# Patient Record
Sex: MALE | Race: WHITE | NOT HISPANIC OR LATINO
[De-identification: names, ages, dates, MRNs, and addresses within clinical notes are randomized per-mention and may not be internally consistent; named-entity substitution may affect disease eponyms.]

---

## 2017-04-24 ENCOUNTER — APPOINTMENT (OUTPATIENT)
Dept: SURGICAL ONCOLOGY | Facility: CLINIC | Age: 72
End: 2017-04-24

## 2017-05-15 ENCOUNTER — APPOINTMENT (OUTPATIENT)
Dept: SURGICAL ONCOLOGY | Facility: CLINIC | Age: 72
End: 2017-05-15

## 2017-05-15 VITALS
WEIGHT: 169 LBS | SYSTOLIC BLOOD PRESSURE: 134 MMHG | DIASTOLIC BLOOD PRESSURE: 74 MMHG | HEIGHT: 67 IN | OXYGEN SATURATION: 96 % | HEART RATE: 89 BPM | BODY MASS INDEX: 26.53 KG/M2

## 2017-06-20 ENCOUNTER — APPOINTMENT (OUTPATIENT)
Dept: SURGICAL ONCOLOGY | Facility: HOSPITAL | Age: 72
End: 2017-06-20

## 2017-07-06 ENCOUNTER — OUTPATIENT (OUTPATIENT)
Dept: OUTPATIENT SERVICES | Facility: HOSPITAL | Age: 72
LOS: 1 days | End: 2017-07-06
Payer: MEDICARE

## 2017-07-06 DIAGNOSIS — Z98.89 OTHER SPECIFIED POSTPROCEDURAL STATES: Chronic | ICD-10-CM

## 2017-07-06 DIAGNOSIS — K43.2 INCISIONAL HERNIA WITHOUT OBSTRUCTION OR GANGRENE: ICD-10-CM

## 2017-07-06 DIAGNOSIS — Z96.659 PRESENCE OF UNSPECIFIED ARTIFICIAL KNEE JOINT: Chronic | ICD-10-CM

## 2017-07-06 LAB
ALBUMIN SERPL ELPH-MCNC: 3.9 G/DL — SIGNIFICANT CHANGE UP (ref 3.3–5)
ALP SERPL-CCNC: 146 U/L — HIGH (ref 40–120)
ALT FLD-CCNC: 29 U/L — SIGNIFICANT CHANGE UP (ref 4–41)
AST SERPL-CCNC: 37 U/L — SIGNIFICANT CHANGE UP (ref 4–40)
BASOPHILS # BLD AUTO: 0.04 K/UL — SIGNIFICANT CHANGE UP (ref 0–0.2)
BASOPHILS NFR BLD AUTO: 1 % — SIGNIFICANT CHANGE UP (ref 0–2)
BILIRUB DIRECT SERPL-MCNC: 0.1 MG/DL — SIGNIFICANT CHANGE UP (ref 0.1–0.2)
BILIRUB SERPL-MCNC: 0.3 MG/DL — SIGNIFICANT CHANGE UP (ref 0.2–1.2)
BLD GP AB SCN SERPL QL: NEGATIVE — SIGNIFICANT CHANGE UP
BUN SERPL-MCNC: 17 MG/DL — SIGNIFICANT CHANGE UP (ref 7–23)
CALCIUM SERPL-MCNC: 9.4 MG/DL — SIGNIFICANT CHANGE UP (ref 8.4–10.5)
CHLORIDE SERPL-SCNC: 104 MMOL/L — SIGNIFICANT CHANGE UP (ref 98–107)
CO2 SERPL-SCNC: 28 MMOL/L — SIGNIFICANT CHANGE UP (ref 22–31)
CREAT SERPL-MCNC: 0.9 MG/DL — SIGNIFICANT CHANGE UP (ref 0.5–1.3)
EOSINOPHIL # BLD AUTO: 0.31 K/UL — SIGNIFICANT CHANGE UP (ref 0–0.5)
EOSINOPHIL NFR BLD AUTO: 7.9 % — HIGH (ref 0–6)
GLUCOSE SERPL-MCNC: 99 MG/DL — SIGNIFICANT CHANGE UP (ref 70–99)
HCT VFR BLD CALC: 41.2 % — SIGNIFICANT CHANGE UP (ref 39–50)
HGB BLD-MCNC: 13.4 G/DL — SIGNIFICANT CHANGE UP (ref 13–17)
IMM GRANULOCYTES # BLD AUTO: 0.02 # — SIGNIFICANT CHANGE UP
IMM GRANULOCYTES NFR BLD AUTO: 0.5 % — SIGNIFICANT CHANGE UP (ref 0–1.5)
LYMPHOCYTES # BLD AUTO: 1.06 K/UL — SIGNIFICANT CHANGE UP (ref 1–3.3)
LYMPHOCYTES # BLD AUTO: 27 % — SIGNIFICANT CHANGE UP (ref 13–44)
MCHC RBC-ENTMCNC: 30.4 PG — SIGNIFICANT CHANGE UP (ref 27–34)
MCHC RBC-ENTMCNC: 32.5 % — SIGNIFICANT CHANGE UP (ref 32–36)
MCV RBC AUTO: 93.4 FL — SIGNIFICANT CHANGE UP (ref 80–100)
MONOCYTES # BLD AUTO: 0.43 K/UL — SIGNIFICANT CHANGE UP (ref 0–0.9)
MONOCYTES NFR BLD AUTO: 11 % — SIGNIFICANT CHANGE UP (ref 2–14)
NEUTROPHILS # BLD AUTO: 2.06 K/UL — SIGNIFICANT CHANGE UP (ref 1.8–7.4)
NEUTROPHILS NFR BLD AUTO: 52.6 % — SIGNIFICANT CHANGE UP (ref 43–77)
NRBC # FLD: 0 — SIGNIFICANT CHANGE UP
PLATELET # BLD AUTO: 180 K/UL — SIGNIFICANT CHANGE UP (ref 150–400)
PMV BLD: 10.6 FL — SIGNIFICANT CHANGE UP (ref 7–13)
POTASSIUM SERPL-MCNC: 4.4 MMOL/L — SIGNIFICANT CHANGE UP (ref 3.5–5.3)
POTASSIUM SERPL-SCNC: 4.4 MMOL/L — SIGNIFICANT CHANGE UP (ref 3.5–5.3)
PROT SERPL-MCNC: 7.4 G/DL — SIGNIFICANT CHANGE UP (ref 6–8.3)
RBC # BLD: 4.41 M/UL — SIGNIFICANT CHANGE UP (ref 4.2–5.8)
RBC # FLD: 15.9 % — HIGH (ref 10.3–14.5)
RH IG SCN BLD-IMP: POSITIVE — SIGNIFICANT CHANGE UP
SODIUM SERPL-SCNC: 144 MMOL/L — SIGNIFICANT CHANGE UP (ref 135–145)
WBC # BLD: 3.92 K/UL — SIGNIFICANT CHANGE UP (ref 3.8–10.5)
WBC # FLD AUTO: 3.92 K/UL — SIGNIFICANT CHANGE UP (ref 3.8–10.5)

## 2017-07-06 PROCEDURE — 93010 ELECTROCARDIOGRAM REPORT: CPT

## 2017-07-06 RX ORDER — SODIUM CHLORIDE 9 MG/ML
3 INJECTION INTRAMUSCULAR; INTRAVENOUS; SUBCUTANEOUS EVERY 8 HOURS
Qty: 0 | Refills: 0 | Status: DISCONTINUED | OUTPATIENT
Start: 2017-07-17 | End: 2017-07-17

## 2017-07-06 RX ORDER — SODIUM CHLORIDE 9 MG/ML
1000 INJECTION, SOLUTION INTRAVENOUS
Qty: 0 | Refills: 0 | Status: DISCONTINUED | OUTPATIENT
Start: 2017-07-17 | End: 2017-07-17

## 2017-07-07 LAB — CANCER AG19-9 SERPL-ACNC: 37.2 U/ML — SIGNIFICANT CHANGE UP

## 2017-07-07 PROCEDURE — 71020: CPT | Mod: 26

## 2017-07-14 NOTE — ASU PATIENT PROFILE, ADULT - PSH
S/P carpal tunnel release  right  S/P ERCP  4/2016   atypical cells seen on pathology  S/P knee replacement  dl  S/P shoulder surgery

## 2017-07-14 NOTE — ASU PATIENT PROFILE, ADULT - PMH
Arthritis    Bile duct obstruction  blocked common bile duct   stent placed 4/2016  Complete tear of left rotator cuff  rehab  surgery 2014  Exposure to Agent Orange  [facility] 1968-9  [employer]  Gastroesophageal reflux disease without esophagitis    Hay fever    Jaundice     activity  [facility]

## 2017-07-17 ENCOUNTER — INPATIENT (INPATIENT)
Facility: HOSPITAL | Age: 72
LOS: 1 days | Discharge: HOME | End: 2017-07-19
Attending: SPECIALIST | Admitting: SPECIALIST
Payer: MEDICARE

## 2017-07-17 ENCOUNTER — APPOINTMENT (OUTPATIENT)
Dept: SURGICAL ONCOLOGY | Facility: HOSPITAL | Age: 72
End: 2017-07-17

## 2017-07-17 VITALS
DIASTOLIC BLOOD PRESSURE: 74 MMHG | HEIGHT: 66 IN | RESPIRATION RATE: 16 BRPM | SYSTOLIC BLOOD PRESSURE: 151 MMHG | TEMPERATURE: 99 F | WEIGHT: 173.94 LBS | OXYGEN SATURATION: 100 % | HEART RATE: 88 BPM

## 2017-07-17 DIAGNOSIS — Z98.89 OTHER SPECIFIED POSTPROCEDURAL STATES: Chronic | ICD-10-CM

## 2017-07-17 DIAGNOSIS — K43.2 INCISIONAL HERNIA WITHOUT OBSTRUCTION OR GANGRENE: ICD-10-CM

## 2017-07-17 DIAGNOSIS — Z96.659 PRESENCE OF UNSPECIFIED ARTIFICIAL KNEE JOINT: Chronic | ICD-10-CM

## 2017-07-17 LAB
BLD GP AB SCN SERPL QL: NEGATIVE — SIGNIFICANT CHANGE UP
RH IG SCN BLD-IMP: POSITIVE — SIGNIFICANT CHANGE UP

## 2017-07-17 PROCEDURE — 49568: CPT | Mod: 59

## 2017-07-17 PROCEDURE — 49561: CPT

## 2017-07-17 PROCEDURE — 74000: CPT | Mod: 26

## 2017-07-17 RX ORDER — HYDROMORPHONE HYDROCHLORIDE 2 MG/ML
0.5 INJECTION INTRAMUSCULAR; INTRAVENOUS; SUBCUTANEOUS
Qty: 0 | Refills: 0 | Status: DISCONTINUED | OUTPATIENT
Start: 2017-07-17 | End: 2017-07-19

## 2017-07-17 RX ORDER — PANTOPRAZOLE SODIUM 20 MG/1
40 TABLET, DELAYED RELEASE ORAL
Qty: 0 | Refills: 0 | Status: DISCONTINUED | OUTPATIENT
Start: 2017-07-17 | End: 2017-07-19

## 2017-07-17 RX ORDER — NALOXONE HYDROCHLORIDE 4 MG/.1ML
0.1 SPRAY NASAL
Qty: 0 | Refills: 0 | Status: DISCONTINUED | OUTPATIENT
Start: 2017-07-17 | End: 2017-07-19

## 2017-07-17 RX ORDER — ONDANSETRON 8 MG/1
4 TABLET, FILM COATED ORAL EVERY 6 HOURS
Qty: 0 | Refills: 0 | Status: DISCONTINUED | OUTPATIENT
Start: 2017-07-17 | End: 2017-07-19

## 2017-07-17 RX ORDER — SODIUM CHLORIDE 9 MG/ML
1000 INJECTION, SOLUTION INTRAVENOUS
Qty: 0 | Refills: 0 | Status: DISCONTINUED | OUTPATIENT
Start: 2017-07-17 | End: 2017-07-18

## 2017-07-17 RX ORDER — HEPARIN SODIUM 5000 [USP'U]/ML
5000 INJECTION INTRAVENOUS; SUBCUTANEOUS EVERY 8 HOURS
Qty: 0 | Refills: 0 | Status: DISCONTINUED | OUTPATIENT
Start: 2017-07-17 | End: 2017-07-19

## 2017-07-17 RX ORDER — HYDROMORPHONE HYDROCHLORIDE 2 MG/ML
1 INJECTION INTRAMUSCULAR; INTRAVENOUS; SUBCUTANEOUS
Qty: 0 | Refills: 0 | Status: DISCONTINUED | OUTPATIENT
Start: 2017-07-17 | End: 2017-07-17

## 2017-07-17 RX ADMIN — HEPARIN SODIUM 5000 UNIT(S): 5000 INJECTION INTRAVENOUS; SUBCUTANEOUS at 23:14

## 2017-07-17 RX ADMIN — HYDROMORPHONE HYDROCHLORIDE 1 MILLIGRAM(S): 2 INJECTION INTRAMUSCULAR; INTRAVENOUS; SUBCUTANEOUS at 14:12

## 2017-07-17 RX ADMIN — SODIUM CHLORIDE 100 MILLILITER(S): 9 INJECTION, SOLUTION INTRAVENOUS at 23:14

## 2017-07-17 RX ADMIN — HYDROMORPHONE HYDROCHLORIDE 1 MILLIGRAM(S): 2 INJECTION INTRAMUSCULAR; INTRAVENOUS; SUBCUTANEOUS at 13:15

## 2017-07-17 RX ADMIN — HEPARIN SODIUM 5000 UNIT(S): 5000 INJECTION INTRAVENOUS; SUBCUTANEOUS at 17:00

## 2017-07-17 RX ADMIN — SODIUM CHLORIDE 100 MILLILITER(S): 9 INJECTION, SOLUTION INTRAVENOUS at 12:30

## 2017-07-17 NOTE — BRIEF OPERATIVE NOTE - PROCEDURE
Component separation, abdominal wall, open  07/17/2017    Active  [id number]  Incisional hernia repair with mesh  07/17/2017    Active  [id number]

## 2017-07-18 ENCOUNTER — TRANSCRIPTION ENCOUNTER (OUTPATIENT)
Age: 72
End: 2017-07-18

## 2017-07-18 LAB
APTT BLD: 29.9 SEC — SIGNIFICANT CHANGE UP (ref 27.5–37.4)
BUN SERPL-MCNC: 15 MG/DL — SIGNIFICANT CHANGE UP (ref 7–23)
CALCIUM SERPL-MCNC: 8.7 MG/DL — SIGNIFICANT CHANGE UP (ref 8.4–10.5)
CHLORIDE SERPL-SCNC: 99 MMOL/L — SIGNIFICANT CHANGE UP (ref 98–107)
CO2 SERPL-SCNC: 25 MMOL/L — SIGNIFICANT CHANGE UP (ref 22–31)
CREAT SERPL-MCNC: 0.91 MG/DL — SIGNIFICANT CHANGE UP (ref 0.5–1.3)
GLUCOSE SERPL-MCNC: 147 MG/DL — HIGH (ref 70–99)
HCT VFR BLD CALC: 37.8 % — LOW (ref 39–50)
HGB BLD-MCNC: 12.4 G/DL — LOW (ref 13–17)
INR BLD: 0.99 — SIGNIFICANT CHANGE UP (ref 0.88–1.17)
MAGNESIUM SERPL-MCNC: 1.8 MG/DL — SIGNIFICANT CHANGE UP (ref 1.6–2.6)
MCHC RBC-ENTMCNC: 30.2 PG — SIGNIFICANT CHANGE UP (ref 27–34)
MCHC RBC-ENTMCNC: 32.8 % — SIGNIFICANT CHANGE UP (ref 32–36)
MCV RBC AUTO: 92.2 FL — SIGNIFICANT CHANGE UP (ref 80–100)
NRBC # FLD: 0 — SIGNIFICANT CHANGE UP
PHOSPHATE SERPL-MCNC: 3.1 MG/DL — SIGNIFICANT CHANGE UP (ref 2.5–4.5)
PLATELET # BLD AUTO: 131 K/UL — LOW (ref 150–400)
PMV BLD: 11 FL — SIGNIFICANT CHANGE UP (ref 7–13)
POTASSIUM SERPL-MCNC: 5.1 MMOL/L — SIGNIFICANT CHANGE UP (ref 3.5–5.3)
POTASSIUM SERPL-SCNC: 5.1 MMOL/L — SIGNIFICANT CHANGE UP (ref 3.5–5.3)
PROTHROM AB SERPL-ACNC: 11.1 SEC — SIGNIFICANT CHANGE UP (ref 9.8–13.1)
RBC # BLD: 4.1 M/UL — LOW (ref 4.2–5.8)
RBC # FLD: 15.6 % — HIGH (ref 10.3–14.5)
SODIUM SERPL-SCNC: 139 MMOL/L — SIGNIFICANT CHANGE UP (ref 135–145)
WBC # BLD: 9.62 K/UL — SIGNIFICANT CHANGE UP (ref 3.8–10.5)
WBC # FLD AUTO: 9.62 K/UL — SIGNIFICANT CHANGE UP (ref 3.8–10.5)

## 2017-07-18 RX ORDER — SODIUM CHLORIDE 9 MG/ML
1000 INJECTION, SOLUTION INTRAVENOUS
Qty: 0 | Refills: 0 | Status: DISCONTINUED | OUTPATIENT
Start: 2017-07-18 | End: 2017-07-19

## 2017-07-18 RX ORDER — DEXTROSE MONOHYDRATE, SODIUM CHLORIDE, AND POTASSIUM CHLORIDE 50; .745; 4.5 G/1000ML; G/1000ML; G/1000ML
1000 INJECTION, SOLUTION INTRAVENOUS
Qty: 0 | Refills: 0 | Status: DISCONTINUED | OUTPATIENT
Start: 2017-07-18 | End: 2017-07-18

## 2017-07-18 RX ADMIN — SODIUM CHLORIDE 100 MILLILITER(S): 9 INJECTION, SOLUTION INTRAVENOUS at 15:30

## 2017-07-18 RX ADMIN — PANTOPRAZOLE SODIUM 40 MILLIGRAM(S): 20 TABLET, DELAYED RELEASE ORAL at 05:19

## 2017-07-18 RX ADMIN — HEPARIN SODIUM 5000 UNIT(S): 5000 INJECTION INTRAVENOUS; SUBCUTANEOUS at 08:25

## 2017-07-18 RX ADMIN — SODIUM CHLORIDE 100 MILLILITER(S): 9 INJECTION, SOLUTION INTRAVENOUS at 22:11

## 2017-07-18 RX ADMIN — HEPARIN SODIUM 5000 UNIT(S): 5000 INJECTION INTRAVENOUS; SUBCUTANEOUS at 14:56

## 2017-07-18 RX ADMIN — DEXTROSE MONOHYDRATE, SODIUM CHLORIDE, AND POTASSIUM CHLORIDE 100 MILLILITER(S): 50; .745; 4.5 INJECTION, SOLUTION INTRAVENOUS at 08:24

## 2017-07-18 NOTE — CONSULT NOTE ADULT - SUBJECTIVE AND OBJECTIVE BOX
HPI: 73 yo M w/PMH of GERD now s/p incisional hernia repair. Pt reports feeling well, no complaints. Pain well controlled, tolerating liquid diet.    Allergies:  codeine (Rash)  penicillin (Rash)      PAST MEDICAL & SURGICAL HISTORY:  Exposure to Agent Orange: [place] 1968-9  [employer]  Complete tear of left rotator cuff: rehab  surgery 2014  Bile duct obstruction: blocked common bile duct   stent placed 4/2016   activity: [facility]  [place]  Gastroesophageal reflux disease without esophagitis  Jaundice  Hay fever  Arthritis  S/P ERCP: 4/2016   atypical cells seen on pathology  S/P shoulder surgery  S/P knee replacement: dl  S/P carpal tunnel release: right      FAMILY HISTORY:  Family history of throat cancer (Father)      REVIEW OF SYSTEMS:  CONSTITUTIONAL: No fever, weight loss, or fatigue  EYES: No eye pain, visual disturbances, or discharge  NECK: No pain or stiffness  RESPIRATORY: No cough or wheezing, no shortness of breath  CARDIOVASCULAR: No chest pain, palpitations, dizziness, or leg swelling  GASTROINTESTINAL: No abdominal or epigastric pain. No nausea, vomiting, diarrhea or constipation  GENITOURINARY: No dysuria, urinary frequency or urgency, no hematuria  NEUROLOGICAL: No headaches, memory loss, loss of strength, numbness, or tremors  SKIN: No itching, burning, rashes, or lesions   MUSCULOSKELETAL: No joint pain or swelling; No muscle, back, or extremity pain    Medications:  MEDICATIONS  (STANDING):  HYDROmorphone (10 MICROgram(s)/mL) + BUpivacaine 0.0625% in 0.9% Sodium Chloride PCEA 250 milliLiter(s) Epidural PCA Continuous  heparin  Injectable 5000 Unit(s) SubCutaneous every 8 hours  pantoprazole    Tablet 40 milliGRAM(s) Oral before breakfast  dextrose 5% + sodium chloride 0.45%. 1000 milliLiter(s) (100 mL/Hr) IV Continuous <Continuous>    MEDICATIONS  (PRN):  HYDROmorphone (10 MICROgram(s)/mL) + BUpivacaine 0.0625% in 0.9% Sodium Chloride PCEA Rescue Clinician  Bolus 5 milliLiter(s) Epidural every 15 minutes PRN for Pain Score greater than 6  naloxone Injectable 0.1 milliGRAM(s) IV Push every 3 minutes PRN For ANY of the following changes in patient status:  A. RR LESS THAN 10 breaths per minute, B. Oxygen saturation LESS THAN 90%, C. Sedation score of 6  ondansetron Injectable 4 milliGRAM(s) IV Push every 6 hours PRN Nausea  HYDROmorphone  Injectable 0.5 milliGRAM(s) IV Push every 3 hours PRN Severe Pain unrelieved by PCEA    	    PHYSICAL EXAM:  T(C): 36.6 (07-18-17 @ 11:52), Max: 37.2 (07-18-17 @ 05:07)  HR: 62 (07-18-17 @ 11:52) (62 - 96)  BP: 105/47 (07-18-17 @ 11:52) (105/47 - 142/83)  RR: 18 (07-18-17 @ 11:52) (18 - 22)  SpO2: 94% (07-18-17 @ 11:52) (90% - 100%)  Wt(kg): --  I&O's Summary    17 Jul 2017 07:01  -  18 Jul 2017 07:00  --------------------------------------------------------  IN: 2450 mL / OUT: 2763 mL / NET: -313 mL    18 Jul 2017 07:01  -  18 Jul 2017 15:16  --------------------------------------------------------  IN: 1800 mL / OUT: 610 mL / NET: 1190 mL        Appearance: Normal, + epidural catheter  HEENT:   NCAT, PERRL, EOMI	  Lymphatic: No lymphadenopathy  Cardiovascular: Normal S1 S2, RRR  Respiratory: Lungs clear to auscultation BL  Psychiatry: A & O x 3, Mood & affect appropriate  Gastrointestinal:  Soft, Non-tender, + BS  Skin: No rashes, No ecchymoses, No cyanosis	  Neurologic: Non-focal  Extremities: Normal range of motion, No clubbing, cyanosis or edema    	  LABS:	 	    CARDIAC MARKERS:                                12.4   9.62  )-----------( 131      ( 18 Jul 2017 05:30 )             37.8     07-18    139  |  99  |  15  ----------------------------<  147<H>  5.1   |  25  |  0.91    Ca    8.7      18 Jul 2017 05:30  Phos  3.1     07-18  Mg     1.8     07-18      proBNP:   Lipid Profile:   HgA1c:   TSH:

## 2017-07-18 NOTE — CONSULT NOTE ADULT - ASSESSMENT
73 yo M with GERD, hernia repair:  1. S/p hernia repair - pain control. diet per Surgery, OOB, PT, incentive spirometry.  2. GERD - c/w PPI  3. Mechanical DVT prophylaxis while epidural in place

## 2017-07-18 NOTE — PROGRESS NOTE ADULT - ASSESSMENT
A/P: 72M POD#1 s/p repair incision hernia, abd wall recon w/ B/L component separation, doing well  - Pain control with PCEA per pain management  - IS  - Ambulate  - DVT prophylaxis  - Diet per primary  - No toradol  - C/w abdominal binder  - C/w KOLBY care

## 2017-07-18 NOTE — PROGRESS NOTE ADULT - ASSESSMENT
73 y/o M exlap, ANGE, b/l component separation, closure with retrorectus mesh doing well awaiting ROBF    - Diet: Clear liquid diet  - Pain control: Continue Dilaudid PCEA, No toradol  - Continue to monitor urine output. Mantain fuller catheter.   - DVT Prophylaxis SQH    Heladio Ng, PGY1

## 2017-07-19 ENCOUNTER — TRANSCRIPTION ENCOUNTER (OUTPATIENT)
Age: 72
End: 2017-07-19

## 2017-07-19 VITALS
OXYGEN SATURATION: 99 % | HEART RATE: 88 BPM | TEMPERATURE: 99 F | SYSTOLIC BLOOD PRESSURE: 142 MMHG | DIASTOLIC BLOOD PRESSURE: 68 MMHG | RESPIRATION RATE: 18 BRPM

## 2017-07-19 LAB
APTT BLD: 29.8 SEC — SIGNIFICANT CHANGE UP (ref 27.5–37.4)
BUN SERPL-MCNC: 14 MG/DL — SIGNIFICANT CHANGE UP (ref 7–23)
CALCIUM SERPL-MCNC: 8.2 MG/DL — LOW (ref 8.4–10.5)
CHLORIDE SERPL-SCNC: 98 MMOL/L — SIGNIFICANT CHANGE UP (ref 98–107)
CO2 SERPL-SCNC: 27 MMOL/L — SIGNIFICANT CHANGE UP (ref 22–31)
CREAT SERPL-MCNC: 0.91 MG/DL — SIGNIFICANT CHANGE UP (ref 0.5–1.3)
GLUCOSE SERPL-MCNC: 149 MG/DL — HIGH (ref 70–99)
HCT VFR BLD CALC: 37.8 % — LOW (ref 39–50)
HGB BLD-MCNC: 12.3 G/DL — LOW (ref 13–17)
INR BLD: 0.96 — SIGNIFICANT CHANGE UP (ref 0.88–1.17)
MAGNESIUM SERPL-MCNC: 1.9 MG/DL — SIGNIFICANT CHANGE UP (ref 1.6–2.6)
MCHC RBC-ENTMCNC: 30.4 PG — SIGNIFICANT CHANGE UP (ref 27–34)
MCHC RBC-ENTMCNC: 32.5 % — SIGNIFICANT CHANGE UP (ref 32–36)
MCV RBC AUTO: 93.6 FL — SIGNIFICANT CHANGE UP (ref 80–100)
NRBC # FLD: 0 — SIGNIFICANT CHANGE UP
PHOSPHATE SERPL-MCNC: 2.1 MG/DL — LOW (ref 2.5–4.5)
PLATELET # BLD AUTO: 146 K/UL — LOW (ref 150–400)
PMV BLD: 10.5 FL — SIGNIFICANT CHANGE UP (ref 7–13)
POTASSIUM SERPL-MCNC: 4.4 MMOL/L — SIGNIFICANT CHANGE UP (ref 3.5–5.3)
POTASSIUM SERPL-SCNC: 4.4 MMOL/L — SIGNIFICANT CHANGE UP (ref 3.5–5.3)
PROTHROM AB SERPL-ACNC: 10.8 SEC — SIGNIFICANT CHANGE UP (ref 9.8–13.1)
RBC # BLD: 4.04 M/UL — LOW (ref 4.2–5.8)
RBC # FLD: 15.8 % — HIGH (ref 10.3–14.5)
SODIUM SERPL-SCNC: 138 MMOL/L — SIGNIFICANT CHANGE UP (ref 135–145)
WBC # BLD: 9.4 K/UL — SIGNIFICANT CHANGE UP (ref 3.8–10.5)
WBC # FLD AUTO: 9.4 K/UL — SIGNIFICANT CHANGE UP (ref 3.8–10.5)

## 2017-07-19 RX ORDER — ACETAMINOPHEN 500 MG
650 TABLET ORAL EVERY 6 HOURS
Qty: 0 | Refills: 0 | Status: DISCONTINUED | OUTPATIENT
Start: 2017-07-19 | End: 2017-07-19

## 2017-07-19 RX ORDER — OXYCODONE HYDROCHLORIDE 5 MG/1
5 TABLET ORAL EVERY 4 HOURS
Qty: 0 | Refills: 0 | Status: DISCONTINUED | OUTPATIENT
Start: 2017-07-19 | End: 2017-07-19

## 2017-07-19 RX ORDER — OXYCODONE HYDROCHLORIDE 5 MG/1
10 TABLET ORAL EVERY 4 HOURS
Qty: 0 | Refills: 0 | Status: DISCONTINUED | OUTPATIENT
Start: 2017-07-19 | End: 2017-07-19

## 2017-07-19 RX ADMIN — HEPARIN SODIUM 5000 UNIT(S): 5000 INJECTION INTRAVENOUS; SUBCUTANEOUS at 17:11

## 2017-07-19 RX ADMIN — HEPARIN SODIUM 5000 UNIT(S): 5000 INJECTION INTRAVENOUS; SUBCUTANEOUS at 00:07

## 2017-07-19 RX ADMIN — PANTOPRAZOLE SODIUM 40 MILLIGRAM(S): 20 TABLET, DELAYED RELEASE ORAL at 05:09

## 2017-07-19 RX ADMIN — HEPARIN SODIUM 5000 UNIT(S): 5000 INJECTION INTRAVENOUS; SUBCUTANEOUS at 08:56

## 2017-07-19 RX ADMIN — Medication 63.75 MILLIMOLE(S): at 08:56

## 2017-07-19 NOTE — DISCHARGE NOTE ADULT - CARE PROVIDER_API CALL
Cali Love), ColonRectal Surgery; Surgery  [address] [place] [address]  Phone: [phone]  Fax: [phone]

## 2017-07-19 NOTE — DISCHARGE NOTE ADULT - PATIENT PORTAL LINK FT
“You can access the [facility] Patient Portal, offered by [facility], by registering with the following website: [email or web address]”

## 2017-07-19 NOTE — DISCHARGE NOTE ADULT - PLAN OF CARE
Resolution of hernia The patient may resume a regular diet as tolerated. Take medications as prescribed. No heavy lifting. The patient may resume a regular diet as tolerated. Take medications as prescribed. No heavy lifting. You will be discharged with KOLBY drains. You will need to empty them and record outputs accurately. This will be taught to you by the nursing staff. Please do not remove the KOLBY drains. They will be removed in the office. Please bring to the office accurate records of output.

## 2017-07-19 NOTE — PROGRESS NOTE ADULT - ASSESSMENT
A/P: 72M POD#2 s/p repair incision hernia, abd wall recon w/ B/L component separation, doing well  - Pain control with PCEA per pain management, possibly D/C today  - IS  - Ambulate  - DVT prophylaxis  - Diet per primary  - No toradol  - C/w abdominal binder  - C/w KOLBY care - keep in until follow up  - Dispo per general surgery

## 2017-07-19 NOTE — DISCHARGE NOTE ADULT - ADDITIONAL INSTRUCTIONS
Please follow up with Dr. Love within 1-2 weeks after discharge from the hospital. You may call [phone] to schedule an appointment. Please follow up with Dr. Love within 1 after discharge from the hospital. You may call [phone] to schedule an appointment.

## 2017-07-19 NOTE — PROGRESS NOTE ADULT - SUBJECTIVE AND OBJECTIVE BOX
Post-operative check       SUBJECTIVE: Pt seen and examined at bedside. Pt reports feeling well with mild ABD soreness.  Pain is controlled with PCEA,. Pt denies fever, chills, nausea, vomiting, abdominal pain, CP, SOB and/or difficulty breathing. Pt has had ice chips and tolerated. Pt denies passing flatus or BM.     Vitals: T(C): 36.5 (07-17-17 @ 15:00), Max: 36.5 (07-17-17 @ 15:00)  HR: 82 (07-17-17 @ 16:00)  BP: 410/83 (07-17-17 @ 16:00)  ABP: --  ABP(mean): --  RR: 22 (07-17-17 @ 16:00)  SpO2: 99% (07-17-17 @ 16:00)  Wt(kg): --  I's and O's:   07-17 @ 07:01  -  07-17 @ 16:50  --------------------------------------------------------  IN:    lactated ringers.: 400 mL    Oral Fluid: 250 mL  Total IN: 650 mL    OUT:    Bulb: 5 mL    Bulb: 30 mL    Bulb: 15 mL    JP1,2, & 3 with serosangunious drainage, no blood clots intact    Indwelling Catheter - Urethral: 740 mL  Total OUT: 790 mL    Total NET: -140 mL    I&O's Detail    17 Jul 2017 07:01  -  17 Jul 2017 16:50  --------------------------------------------------------  IN:    lactated ringers.: 400 mL    Oral Fluid: 250 mL  Total IN: 650 mL    OUT:    Bulb: 5 mL    Bulb: 30 mL    Bulb: 15 mL    Indwelling Catheter - Urethral: 740 mL  Total OUT: 790 mL    Total NET: -140 mL      PHYSICAL EXAM  General: Pt in NAD, A & O x3  Heart: S1, S2   Abdomen:  soft, non distended, dressing/ incision C/D/I .  No palpable masses, no guarding no rebound tenderness.  No bleeding, no discharge from wound. ABD binder intact  EXT: warm B/L LE no edema    MEDICATIONS  (STANDING):  HYDROmorphone (10 MICROgram(s)/mL) + BUpivacaine 0.0625% in 0.9% Sodium Chloride PCEA 250 milliLiter(s) Epidural PCA Continuous  heparin  Injectable 5000 Unit(s) SubCutaneous every 8 hours  lactated ringers. 1000 milliLiter(s) (100 mL/Hr) IV Continuous <Continuous>  pantoprazole    Tablet 40 milliGRAM(s) Oral before breakfast    MEDICATIONS  (PRN):  HYDROmorphone  Injectable 1 milliGRAM(s) IV Push every 10 minutes PRN Moderate Pain  HYDROmorphone (10 MICROgram(s)/mL) + BUpivacaine 0.0625% in 0.9% Sodium Chloride PCEA Rescue Clinician  Bolus 5 milliLiter(s) Epidural every 15 minutes PRN for Pain Score greater than 6  naloxone Injectable 0.1 milliGRAM(s) IV Push every 3 minutes PRN For ANY of the following changes in patient status:  A. RR LESS THAN 10 breaths per minute, B. Oxygen saturation LESS THAN 90%, C. Sedation score of 6  ondansetron Injectable 4 milliGRAM(s) IV Push every 6 hours PRN Nausea  HYDROmorphone  Injectable 0.5 milliGRAM(s) IV Push every 3 hours PRN Severe Pain unrelieved by PCEA      71 y/o M exlap, ANGE, b/l component separation, closure with retrorectus mesh    - Diet: Clear liquid diet  - Continue IVF LR @ 100 mL/hr  - Pain control: Continue Dilaudid PCEA, No toradol  - Continue to monitor urine output. Mantain fuller catheter.   - DVT Prophylaxis SQH  -Follow up AM CBC. PT/INR/PTT and BMP
Anesthesia Pain Management Service    SUBJECTIVE: Pt doing well with PCEA removed & no problems reported.    Therapy:	  [ ] IV PCA	   [ X] Epidural stopped          [ ] s/p Spinal Opoid              [ ] Postpartum infusion	  [ ] Patient controlled regional anesthesia (PCRA)    [ ] prn Analgesics    Allergies    codeine (Rash)  penicillin (Rash)    Intolerances      MEDICATIONS  (STANDING):  heparin  Injectable 5000 Unit(s) SubCutaneous every 8 hours  pantoprazole    Tablet 40 milliGRAM(s) Oral before breakfast    MEDICATIONS  (PRN):  naloxone Injectable 0.1 milliGRAM(s) IV Push every 3 minutes PRN For ANY of the following changes in patient status:  A. RR LESS THAN 10 breaths per minute, B. Oxygen saturation LESS THAN 90%, C. Sedation score of 6  ondansetron Injectable 4 milliGRAM(s) IV Push every 6 hours PRN Nausea  acetaminophen   Tablet. 650 milliGRAM(s) Oral every 6 hours PRN Mild Pain (1 - 3)  oxyCODONE    IR 5 milliGRAM(s) Oral every 4 hours PRN Moderate Pain (4 - 6)  oxyCODONE    IR 10 milliGRAM(s) Oral every 4 hours PRN Severe Pain (7 - 10)      OBJECTIVE:   [X] No new signs     [ ] Other:    Side Effects:  [X ] None			[ ] Other:    Assessment of Catheter Site:		[ ] Intact		[ ] Other:    ASSESSMENT/PLAN  [ ] Continue current therapy    [X ] Therapy changed to:    [ ] IV PCA       [ ] Epidural     [ X] prn Analgesics     Comments: Epidural stopped & now to go on oral/IV opioids & adjuvant medication as needed.
Anesthesia Pain Management Service- Attending Addendum    SUBJECTIVE: Pt doing well with PCEA without problems reported.    Therapy:	  [ ] IV PCA	   [ X] Epidural           [ ] s/p Spinal Opoid              [ ] Postpartum infusion	  [ ] Patient controlled regional anesthesia (PCRA)    [ ] prn Analgesics    Allergies    codeine (Rash)  penicillin (Rash)    Intolerances      MEDICATIONS  (STANDING):  HYDROmorphone (10 MICROgram(s)/mL) + BUpivacaine 0.0625% in 0.9% Sodium Chloride PCEA 250 milliLiter(s) Epidural PCA Continuous  heparin  Injectable 5000 Unit(s) SubCutaneous every 8 hours  pantoprazole    Tablet 40 milliGRAM(s) Oral before breakfast  dextrose 5% + sodium chloride 0.45%. 1000 milliLiter(s) (100 mL/Hr) IV Continuous <Continuous>    MEDICATIONS  (PRN):  HYDROmorphone (10 MICROgram(s)/mL) + BUpivacaine 0.0625% in 0.9% Sodium Chloride PCEA Rescue Clinician  Bolus 5 milliLiter(s) Epidural every 15 minutes PRN for Pain Score greater than 6  naloxone Injectable 0.1 milliGRAM(s) IV Push every 3 minutes PRN For ANY of the following changes in patient status:  A. RR LESS THAN 10 breaths per minute, B. Oxygen saturation LESS THAN 90%, C. Sedation score of 6  ondansetron Injectable 4 milliGRAM(s) IV Push every 6 hours PRN Nausea  HYDROmorphone  Injectable 0.5 milliGRAM(s) IV Push every 3 hours PRN Severe Pain unrelieved by PCEA      OBJECTIVE:   [X] No new signs     [ ] Other:    Side Effects:  [X ] None			[ ] Other:    Assessment of Catheter Site:		[ X] Intact		[ ] Other:    ASSESSMENT/PLAN  [ X] Continue current therapy    [ ] Therapy changed to:    [ ] IV PCA       [ ] Epidural     [ ] prn Analgesics     Comments: Continue current PCEA settings.
CHIEF COMPLAINT:Patient is a 72y old  Male who presents with a chief complaint of Repair incisional hernia, abdominal wall recon with B/L component separation (19 Jul 2017 09:43)    	  Interval history: pt on regular diet, tolerates well      Allergies:  codeine (Rash)  penicillin (Rash)      PAST MEDICAL & SURGICAL HISTORY:  Exposure to Agent Orange: [place] 1968-9  [employer]  Complete tear of left rotator cuff: rehab  surgery 2014  Bile duct obstruction: blocked common bile duct   stent placed 4/2016   activity: [place]  [employer]  Gastroesophageal reflux disease without esophagitis  Jaundice  Hay fever  Arthritis  S/P ERCP: 4/2016   atypical cells seen on pathology  S/P shoulder surgery  S/P knee replacement: dl  S/P carpal tunnel release: right      FAMILY HISTORY:  Family history of throat cancer (Father)      REVIEW OF SYSTEMS:  CONSTITUTIONAL: No fever, weight loss, or fatigue  EYES: No eye pain, visual disturbances, or discharge  NECK: No pain or stiffness  RESPIRATORY: No cough or wheezing, no shortness of breath  CARDIOVASCULAR: No chest pain, palpitations, dizziness, or leg swelling  GASTROINTESTINAL: No abdominal or epigastric pain. No nausea, vomiting, diarrhea or constipation  GENITOURINARY: No dysuria, urinary frequency or urgency, no hematuria  NEUROLOGICAL: No headaches, memory loss, loss of strength, numbness, or tremors  SKIN: No itching, burning, rashes, or lesions   MUSCULOSKELETAL: No joint pain or swelling; No muscle, back, or extremity pain    Medications:  MEDICATIONS  (STANDING):  heparin  Injectable 5000 Unit(s) SubCutaneous every 8 hours  pantoprazole    Tablet 40 milliGRAM(s) Oral before breakfast    MEDICATIONS  (PRN):  acetaminophen   Tablet. 650 milliGRAM(s) Oral every 6 hours PRN Mild Pain (1 - 3)  oxyCODONE    IR 5 milliGRAM(s) Oral every 4 hours PRN Moderate Pain (4 - 6)  oxyCODONE    IR 10 milliGRAM(s) Oral every 4 hours PRN Severe Pain (7 - 10)    	    PHYSICAL EXAM:  T(C): 36.8 (07-19-17 @ 12:00), Max: 37.3 (07-18-17 @ 23:55)  HR: 84 (07-19-17 @ 12:00) (72 - 91)  BP: 113/55 (07-19-17 @ 12:00) (113/55 - 139/69)  RR: 18 (07-19-17 @ 12:00) (18 - 18)  SpO2: 95% (07-19-17 @ 12:00) (93% - 98%)  Wt(kg): --  I&O's Summary    18 Jul 2017 07:01  -  19 Jul 2017 07:00  --------------------------------------------------------  IN: 3820 mL / OUT: 3515 mL / NET: 305 mL    19 Jul 2017 07:01  -  19 Jul 2017 15:26  --------------------------------------------------------  IN: 0 mL / OUT: 515 mL / NET: -515 mL        Appearance: Normal	  HEENT:   NCAT, PERRL, EOMI	  Lymphatic: No lymphadenopathy  Cardiovascular: Normal S1 S2, RRR  Respiratory: Lungs clear to auscultation BL  Psychiatry: A & O x 3, Mood & affect appropriate  Gastrointestinal:  Soft, Non-tender, + BS  Skin: No rashes, No ecchymoses, No cyanosis	  Neurologic: Non-focal  Extremities: Normal range of motion, No clubbing, cyanosis or edema    	  LABS:	 	    CARDIAC MARKERS:                                12.3   9.40  )-----------( 146      ( 19 Jul 2017 05:40 )             37.8     07-19    138  |  98  |  14  ----------------------------<  149<H>  4.4   |  27  |  0.91    Ca    8.2<L>      19 Jul 2017 05:30  Phos  2.1     07-19  Mg     1.9     07-19      proBNP:   Lipid Profile:   HgA1c:   TSH:
Day _2_ of Anesthesia Pain Management Service    Allergies    codeine (Rash)  penicillin (Rash)    SUBJECTIVE: "I'm not having any pain at all."  Pain Scale Score	At rest: _1/10__ 	With Activity: ___ 	[  ] Refer to charted pain scores    THERAPY:  [X] Epidural Bupivacaine 0.0625% and Hydromorphone  		[X] 10 micrograms/mL	[ ] 5 micrograms/mL  [ ] Epidural Bupivacaine 0.0625% and Fentanyl - 2 micrograms/mL  [ ] Epidural Ropivacaine 0.1% plain – 1 mg/mL  [ ] Patient Controlled Regional Anesthesia (PCRA) Ropivacaine  		[ ] 0.2%			[ ] 0.1%    Demand dose _3mL_ lockout _15min_ (minutes) Continuous Rate _6mL_ Total: _122.3ml_ Daily      MEDICATIONS  (STANDING):  HYDROmorphone (10 MICROgram(s)/mL) + BUpivacaine 0.0625% in 0.9% Sodium Chloride PCEA 250 milliLiter(s) Epidural PCA Continuous  heparin  Injectable 5000 Unit(s) SubCutaneous every 8 hours  pantoprazole    Tablet 40 milliGRAM(s) Oral before breakfast  dextrose 5% + sodium chloride 0.45%. 1000 milliLiter(s) (100 mL/Hr) IV Continuous <Continuous>    MEDICATIONS  (PRN):  HYDROmorphone (10 MICROgram(s)/mL) + BUpivacaine 0.0625% in 0.9% Sodium Chloride PCEA Rescue Clinician  Bolus 5 milliLiter(s) Epidural every 15 minutes PRN for Pain Score greater than 6  naloxone Injectable 0.1 milliGRAM(s) IV Push every 3 minutes PRN For ANY of the following changes in patient status:  A. RR LESS THAN 10 breaths per minute, B. Oxygen saturation LESS THAN 90%, C. Sedation score of 6  ondansetron Injectable 4 milliGRAM(s) IV Push every 6 hours PRN Nausea  HYDROmorphone  Injectable 0.5 milliGRAM(s) IV Push every 3 hours PRN Severe Pain unrelieved by PCEA      OBJECTIVE: Patient A&Ox3, NAD, sitting up in chair.    Assessment of Catheter Site:	[ ] Left	[ ] Right  [X] Epidural 	[ ] Femoral	      [ ] Saphenous   [ ] Supraclavicular   [ ] Other:    [X] Dressing intact	[X] Site non-tender	[X] Site without erythema, discharge, edema  [X] Epidural tubing and connection checked	[X] Gross neurological exam within normal limits  [ ] Catheter removed – tip intact		    [X ] Temperatue:  _36.6_ [TMax: ]    Sedation Score:	[X] Alert	[ ] Drowsy	[ ] Arousable	[ ] Asleep	[ ] Unresponsive    Side Effects:	[X] None	[ ] Nausea	[ ] Vomiting	[ ] Pruritus  		[ ] Weakness		[ ] Numbness	[ ] Other:    PT/INR - ( 18 Jul 2017 05:30 )   PT: 11.1 SEC;   INR: 0.99          PTT - ( 18 Jul 2017 05:30 )  PTT:29.9 SEC                          12.4   9.62  )-----------( 131      ( 18 Jul 2017 05:30 )             37.8       07-18    139  |  99  |  15  ----------------------------<  147<H>  5.1   |  25  |  0.91    Ca    8.7      18 Jul 2017 05:30  Phos  3.1     07-18  Mg     1.8     07-18        ASSESSMENT/ PLAN:    Therapy to  be:	[X] Continue   [ ] Discontinued   [ ] Change to prn Analgesics    Documentation and Verification of current medications:  [X] Done	[ ] Not done, not eligible  [ ] Not done, reason not given      COMMENTS:  Dressing reinforced.  No systemic anticoagulant sign placed above bed.
Day _3_ of Anesthesia Pain Management Service    Allergies    codeine (Rash)  penicillin (Rash)    SUBJECTIVE: "I'm doing pretty good."    Pain Scale Score	At rest: _1/10_ 	With Activity: ___ 	[  ] Refer to charted pain scores    THERAPY:  [X] Epidural Bupivacaine 0.0625% and Hydromorphone  		[X] 10 micrograms/mL	[ ] 5 micrograms/mL  [ ] Epidural Bupivacaine 0.0625% and Fentanyl - 2 micrograms/mL  [ ] Epidural Ropivacaine 0.1% plain – 1 mg/mL  [ ] Patient Controlled Regional Anesthesia (PCRA) Ropivacaine  		[ ] 0.2%			[ ] 0.1%    Demand dose _3mL_ lockout _15min_ (minutes) Continuous Rate _6mL_ Total: _150ml_ Daily      MEDICATIONS  (STANDING):  heparin  Injectable 5000 Unit(s) SubCutaneous every 8 hours  pantoprazole    Tablet 40 milliGRAM(s) Oral before breakfast    MEDICATIONS  (PRN):  naloxone Injectable 0.1 milliGRAM(s) IV Push every 3 minutes PRN For ANY of the following changes in patient status:  A. RR LESS THAN 10 breaths per minute, B. Oxygen saturation LESS THAN 90%, C. Sedation score of 6  ondansetron Injectable 4 milliGRAM(s) IV Push every 6 hours PRN Nausea  acetaminophen   Tablet. 650 milliGRAM(s) Oral every 6 hours PRN Mild Pain (1 - 3)  oxyCODONE    IR 5 milliGRAM(s) Oral every 4 hours PRN Moderate Pain (4 - 6)  oxyCODONE    IR 10 milliGRAM(s) Oral every 4 hours PRN Severe Pain (7 - 10)      OBJECTIVE: Patient A&Ox3, NAD, sitting up in chair.    Assessment of Catheter Site:	[ ] Left	[ ] Right  [X] Epidural 	[ ] Femoral	      [ ] Saphenous   [ ] Supraclavicular   [ ] Other:    [X] Dressing intact	[X] Site non-tender	[X] Site without erythema, discharge, edema  [ ] Epidural tubing and connection checked	[X] Gross neurological exam within normal limits  [x] Catheter removed – tip intact		    [X ] Temperatue:  _36.8_     Sedation Score:	[X] Alert	[ ] Drowsy	[ ] Arousable	[ ] Asleep	[ ] Unresponsive    Side Effects:	[X] None	[ ] Nausea	[ ] Vomiting	[ ] Pruritus  		[ ] Weakness		[ ] Numbness	[ ] Other:    PT/INR - ( 19 Jul 2017 05:30 )   PT: 10.8 SEC;   INR: 0.96          PTT - ( 19 Jul 2017 05:30 )  PTT:29.8 SEC                          12.3   9.40  )-----------( 146      ( 19 Jul 2017 05:40 )             37.8       07-19    138  |  98  |  14  ----------------------------<  149<H>  4.4   |  27  |  0.91    Ca    8.2<L>      19 Jul 2017 05:30  Phos  2.1     07-19  Mg     1.9     07-19        ASSESSMENT/ PLAN:    Therapy to  be:	[ ] Continue   [x] Discontinued   [x] Change to prn Analgesics    Documentation and Verification of current medications:  [X] Done	[ ] Not done, not eligible  [ ] Not done, reason not given      COMMENTS:  Dressing reinforced.  No systemic anticoagulant sign placed above bed.
Epidural catheter connected to pump for post-op analgesia.  Negative aspiration of heme/csf.  RN checked and started the pump.
Plastic Surgery Progress Note P [id number]    SUBJECTIVE:  s/p repair incisional hernia, abdominal wall recon with B/L component separation. The patient is doing well. No acute events overnight. Tolerating clears. On PCEA.    OBJECTIVE:     ** VITAL SIGNS / I&O's **    Vital Signs Last 24 Hrs  T(C): 36.7 (18 Jul 2017 17:08), Max: 37.2 (18 Jul 2017 05:07)  T(F): 98 (18 Jul 2017 17:08), Max: 98.9 (18 Jul 2017 05:07)  HR: 72 (18 Jul 2017 17:08) (62 - 96)  BP: 122/62 (18 Jul 2017 17:08) (105/47 - 129/63)  BP(mean): --  RR: 18 (18 Jul 2017 17:08) (18 - 20)  SpO2: 97% (18 Jul 2017 17:08) (90% - 99%)      17 Jul 2017 07:01  -  18 Jul 2017 07:00  --------------------------------------------------------  IN:    lactated ringers.: 1800 mL    Oral Fluid: 650 mL  Total IN: 2450 mL    OUT:    Bulb: 88 mL    Bulb: 140 mL    Bulb: 65 mL    Indwelling Catheter - Urethral: 2470 mL  Total OUT: 2763 mL    Total NET: -313 mL      18 Jul 2017 07:01  -  18 Jul 2017 18:51  --------------------------------------------------------  IN:    dextrose 5% + sodium chloride 0.45% with potassium chloride 20 mEq/L: 400 mL    dextrose 5% + sodium chloride 0.45%.: 600 mL    Oral Fluid: 1920 mL  Total IN: 2920 mL    OUT:    Bulb: 41 mL    Bulb: 64 mL    Bulb: 65 mL    Indwelling Catheter - Urethral: 1150 mL  Total OUT: 1320 mL    Total NET: 1600 mL          ** PHYSICAL EXAM **    -- CONSTITUTIONAL: Alert, NAD.   -- ABDOMEN: midline incision c/d/i, no erythema, no fluid collections, soft, non-tender, JPs serosang    ** LABS **                          12.4   9.62  )-----------( 131      ( 18 Jul 2017 05:30 )             37.8     18 Jul 2017 05:30    139    |  99     |  15     ----------------------------<  147    5.1     |  25     |  0.91     Ca    8.7        18 Jul 2017 05:30  Phos  3.1       18 Jul 2017 05:30  Mg     1.8       18 Jul 2017 05:30      PT/INR - ( 18 Jul 2017 05:30 )   PT: 11.1 SEC;   INR: 0.99          PTT - ( 18 Jul 2017 05:30 )  PTT:29.9 SEC  CAPILLARY BLOOD GLUCOSE
Team D Progress Note:    SUBJECTIVE: Pt seen and examined at bedside. Pt reports feeling welling much better today with great pain control on PCEA. Pt has had ice chips and tolerated. Pt denies passing flatus or BM.     T(C): 36.8 (07-18-17 @ 08:22), Max: 37.2 (07-18-17 @ 05:07)  HR: 79 (07-18-17 @ 08:22) (68 - 96)  BP: 105/59 (07-18-17 @ 08:22) (102/65 - 145/83)  RR: 18 (07-18-17 @ 08:22) (15 - 22)  SpO2: 90% (07-18-17 @ 08:22) (90% - 100%)      I&O's Summary    17 Jul 2017 07:01  -  18 Jul 2017 07:00  --------------------------------------------------------  IN: 2450 mL / OUT: 2763 mL / NET: -313 mL    18 Jul 2017 07:01  -  18 Jul 2017 09:49  --------------------------------------------------------  IN: 300 mL / OUT: 50 mL / NET: 250 mL                                12.4   9.62  )-----------( 131      ( 18 Jul 2017 05:30 )             37.8     18 Jul 2017 05:30    139    |  99     |  15     ----------------------------<  147    5.1     |  25     |  0.91     Ca    8.7        18 Jul 2017 05:30  Phos  3.1       18 Jul 2017 05:30  Mg     1.8       18 Jul 2017 05:30      PT/INR - ( 18 Jul 2017 05:30 )   PT: 11.1 SEC;   INR: 0.99          PTT - ( 18 Jul 2017 05:30 )  PTT:29.9 SEC  CAPILLARY BLOOD GLUCOSE            PHYSICAL EXAM  General: Pt in NAD, A&Ox3  Abdomen:  soft, non distended, dressing/ incision C/D/I .  No palpable masses, no guarding no rebound tenderness.  No bleeding, no discharge from wound. ABD binder intact
Team D Progress Note:    SUBJECTIVE: Pt seen and examined at bedside. Pt reports feeling welling much better today with minimal pain and has not been using his PCEA as much. Tolerating regular diet. +flatus; denies BM.     T(C): 36.6 (07-19-17 @ 04:58), Max: 37.3 (07-18-17 @ 23:55)  HR: 78 (07-19-17 @ 04:58) (62 - 91)  BP: 113/69 (07-19-17 @ 04:58) (105/47 - 139/69)  RR: 18 (07-19-17 @ 04:58) (18 - 18)  SpO2: 94% (07-19-17 @ 04:58) (90% - 98%)  Wt(kg): --    07-18 @ 07:01  -  07-19 @ 07:00  --------------------------------------------------------  IN:    dextrose 5% + sodium chloride 0.45% with potassium chloride 20 mEq/L: 400 mL    dextrose 5% + sodium chloride 0.45%.: 1500 mL    Oral Fluid: 1920 mL  Total IN: 3820 mL    OUT:    Bulb: 160 mL    Bulb: 91 mL    Bulb: 164 mL    Indwelling Catheter - Urethral: 3100 mL  Total OUT: 3515 mL    Total NET: 305 mL      PHYSICAL EXAM  General: Pt in NAD, A&Ox3  Abdomen:  soft, non distended, dressing/ incision C/D/I .  No palpable masses, no guarding no rebound tenderness.  No bleeding, no discharge from wound. ABD binder intact
Plastic Surgery Progress Note P [id number]    SUBJECTIVE:  The patient is doing well. No acute events overnight. Started regular diet yesterday. Passed gas. On PCEA and has Mason. Ambulating well.    OBJECTIVE:     ** VITAL SIGNS / I&O's **    Vital Signs Last 24 Hrs  T(C): 36.3 (19 Jul 2017 08:00), Max: 37.3 (18 Jul 2017 23:55)  T(F): 97.4 (19 Jul 2017 08:00), Max: 99.2 (18 Jul 2017 23:55)  HR: 88 (19 Jul 2017 08:00) (62 - 91)  BP: 128/68 (19 Jul 2017 08:00) (105/47 - 139/69)  BP(mean): --  RR: 18 (19 Jul 2017 08:00) (18 - 18)  SpO2: 93% (19 Jul 2017 08:00) (93% - 98%)      18 Jul 2017 07:01  -  19 Jul 2017 07:00  --------------------------------------------------------  IN:    dextrose 5% + sodium chloride 0.45% with potassium chloride 20 mEq/L: 400 mL    dextrose 5% + sodium chloride 0.45%.: 1500 mL    Oral Fluid: 1920 mL  Total IN: 3820 mL    OUT:    Bulb: 164 mL    Bulb: 160 mL    Bulb: 91 mL    Indwelling Catheter - Urethral: 3100 mL  Total OUT: 3515 mL    Total NET: 305 mL      19 Jul 2017 07:01  -  19 Jul 2017 08:48  --------------------------------------------------------  IN:  Total IN: 0 mL    OUT:    Bulb: 10 mL    Bulb: 20 mL    Bulb: 25 mL  Total OUT: 55 mL    Total NET: -55 mL          ** PHYSICAL EXAM **    -- CONSTITUTIONAL: Alert, NAD.   -- ABDOMEN: midline incision c/d/i, no erythema, no fluid collections, soft, non-tender, JPs serosang    ** LABS **                          12.3   9.40  )-----------( 146      ( 19 Jul 2017 05:40 )             37.8     19 Jul 2017 05:30    138    |  98     |  14     ----------------------------<  149    4.4     |  27     |  0.91     Ca    8.2        19 Jul 2017 05:30  Phos  2.1       19 Jul 2017 05:30  Mg     1.9       19 Jul 2017 05:30      PT/INR - ( 19 Jul 2017 05:30 )   PT: 10.8 SEC;   INR: 0.96          PTT - ( 19 Jul 2017 05:30 )  PTT:29.8 SEC  CAPILLARY BLOOD GLUCOSE

## 2017-07-19 NOTE — PROGRESS NOTE ADULT - ASSESSMENT
71 yo M with GERD, hernia repair:  1. S/p hernia repair - pain control. diet per Surgery, OOB, PT, incentive spirometry.  2. GERD - c/w PPI  3. Mechanical DVT prophylaxis while epidural in place

## 2017-07-19 NOTE — DISCHARGE NOTE ADULT - CARE PLAN
Principal Discharge DX:	Incisional hernia, without obstruction or gangrene  Goal:	Resolution of hernia  Instructions for follow-up, activity and diet:	The patient may resume a regular diet as tolerated. Take medications as prescribed. No heavy lifting. Principal Discharge DX:	Incisional hernia, without obstruction or gangrene  Goal:	Resolution of hernia  Instructions for follow-up, activity and diet:	The patient may resume a regular diet as tolerated. Take medications as prescribed. No heavy lifting. You will be discharged with KOLBY drains. You will need to empty them and record outputs accurately. This will be taught to you by the nursing staff. Please do not remove the KOLBY drains. They will be removed in the office. Please bring to the office accurate records of output.

## 2017-07-19 NOTE — DISCHARGE NOTE ADULT - INSTRUCTIONS
Resume your normal diet. Activity- No heavy lifting or straining over 15 lbs for the next two weeks;  Driving- Please do not drive until your pain is well controlled and you do not need to take pain medications.  You may shower-Do not submerge or scrub incision sites.  Please pat dry incisions/dressings.  Leave the white steri strips in place, they will fall off on their own in approximately 5-7 days.

## 2017-07-19 NOTE — PROGRESS NOTE ADULT - PROVIDER SPECIALTY LIST ADULT
Anesthesia
Internal Medicine
Pain Medicine
Plastic Surgery
Plastic Surgery
Surgery

## 2017-07-19 NOTE — DISCHARGE NOTE ADULT - HOSPITAL COURSE
7/17: Patient underwent a planned incisional hernia repair with mesh and abdominal reconstruction with component separation. Patient did well post-operatively and pain was controlled with a PCEA.    7/18: Patient tolerated a clear liquid diet and was advanced to a regular diet. Pain control adequate.    7/19: Tolerated regular diet. Pain control adequate. Ambulating. Has passed flatus but no BM yet. 7/17: Patient underwent a planned incisional hernia repair with mesh and abdominal reconstruction with component separation. Patient did well post-operatively and pain was controlled with a PCEA.    7/18: Patient tolerated a clear liquid diet and was advanced to a regular diet. Pain control adequate.    7/19: Tolerated regular diet. Pain control adequate. Ambulating. Has passed flatus but no BM yet. At the time of discharge, the patient was hemodynamically stable, was tolerating PO diet, was voiding urine and passing passing flatus, was ambulating, and was comfortable with adequate pain control.

## 2017-07-19 NOTE — PROGRESS NOTE ADULT - ASSESSMENT
73 y/o M exlap, ANGE, b/l component separation, closure with retrorectus mesh. Doing well  - Diet: Reg diet  - Pain control: D/C PCEA, D/C fuller 4 hrs later.  - Continue to monitor urine output once fuller removed.   - DVT Prophylaxis SQH  - Dispo: Possible D/C home today    Dennis Moore PGY1 71 y/o M exlap, ANGE, b/l component separation, closure with retrorectus mesh. Doing well  - Diet: Reg diet  - Pain control: D/C PCEA, D/C fuller 4 hrs later.  - Continue to monitor urine output once fuller removed.   - DVT Prophylaxis SQH  - Dispo: Possible D/C home today. 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    Dennis Moore PGY1

## 2017-07-31 ENCOUNTER — APPOINTMENT (OUTPATIENT)
Dept: SURGICAL ONCOLOGY | Facility: CLINIC | Age: 72
End: 2017-07-31
Payer: MEDICARE

## 2017-07-31 VITALS
HEART RATE: 84 BPM | BODY MASS INDEX: 26.53 KG/M2 | HEIGHT: 67 IN | SYSTOLIC BLOOD PRESSURE: 128 MMHG | RESPIRATION RATE: 15 BRPM | DIASTOLIC BLOOD PRESSURE: 74 MMHG | OXYGEN SATURATION: 98 % | WEIGHT: 169 LBS | TEMPERATURE: 98.7 F

## 2017-07-31 DIAGNOSIS — C22.1 INTRAHEPATIC BILE DUCT CARCINOMA: ICD-10-CM

## 2017-07-31 PROCEDURE — 99024 POSTOP FOLLOW-UP VISIT: CPT

## 2017-08-07 ENCOUNTER — TRANSCRIPTION ENCOUNTER (OUTPATIENT)
Age: 72
End: 2017-08-07

## 2018-01-08 ENCOUNTER — APPOINTMENT (OUTPATIENT)
Dept: SURGICAL ONCOLOGY | Facility: CLINIC | Age: 73
End: 2018-01-08

## 2018-03-12 ENCOUNTER — APPOINTMENT (OUTPATIENT)
Dept: SURGICAL ONCOLOGY | Facility: CLINIC | Age: 73
End: 2018-03-12

## 2018-06-25 ENCOUNTER — TRANSCRIPTION ENCOUNTER (OUTPATIENT)
Age: 73
End: 2018-06-25

## 2018-06-26 ENCOUNTER — OUTPATIENT (OUTPATIENT)
Dept: OUTPATIENT SERVICES | Facility: HOSPITAL | Age: 73
LOS: 1 days | End: 2018-06-26
Payer: MEDICARE

## 2018-06-26 ENCOUNTER — RESULT REVIEW (OUTPATIENT)
Age: 73
End: 2018-06-26

## 2018-06-26 DIAGNOSIS — Z98.89 OTHER SPECIFIED POSTPROCEDURAL STATES: Chronic | ICD-10-CM

## 2018-06-26 DIAGNOSIS — Z96.659 PRESENCE OF UNSPECIFIED ARTIFICIAL KNEE JOINT: Chronic | ICD-10-CM

## 2018-06-26 DIAGNOSIS — R13.10 DYSPHAGIA, UNSPECIFIED: ICD-10-CM

## 2018-06-26 PROCEDURE — 88305 TISSUE EXAM BY PATHOLOGIST: CPT | Mod: 26

## 2018-06-26 PROCEDURE — 88312 SPECIAL STAINS GROUP 1: CPT | Mod: 26

## 2018-06-26 PROCEDURE — 88312 SPECIAL STAINS GROUP 1: CPT

## 2018-06-26 PROCEDURE — C1889: CPT

## 2018-06-26 PROCEDURE — 88305 TISSUE EXAM BY PATHOLOGIST: CPT

## 2018-06-26 PROCEDURE — 45382 COLONOSCOPY W/CONTROL BLEED: CPT | Mod: XU

## 2018-06-26 PROCEDURE — 45380 COLONOSCOPY AND BIOPSY: CPT
